# Patient Record
Sex: MALE | Race: BLACK OR AFRICAN AMERICAN | NOT HISPANIC OR LATINO | URBAN - METROPOLITAN AREA
[De-identification: names, ages, dates, MRNs, and addresses within clinical notes are randomized per-mention and may not be internally consistent; named-entity substitution may affect disease eponyms.]

---

## 2024-01-26 ENCOUNTER — OFFICE VISIT (OUTPATIENT)
Dept: URBAN - METROPOLITAN AREA CLINIC 25 | Facility: CLINIC | Age: 59
End: 2024-01-26

## 2024-02-02 ENCOUNTER — OV CON (OUTPATIENT)
Dept: URBAN - METROPOLITAN AREA CLINIC 25 | Facility: CLINIC | Age: 59
End: 2024-02-02
Payer: COMMERCIAL

## 2024-02-02 VITALS
DIASTOLIC BLOOD PRESSURE: 105 MMHG | HEIGHT: 72 IN | SYSTOLIC BLOOD PRESSURE: 172 MMHG | BODY MASS INDEX: 27.17 KG/M2 | WEIGHT: 200.6 LBS | HEART RATE: 90 BPM | TEMPERATURE: 97.5 F

## 2024-02-02 DIAGNOSIS — R14.0 ABDOMINAL BLOATING: ICD-10-CM

## 2024-02-02 DIAGNOSIS — K40.20 BILATERAL INGUINAL HERNIA WITHOUT OBSTRUCTION OR GANGRENE, RECURRENCE NOT SPECIFIED: ICD-10-CM

## 2024-02-02 DIAGNOSIS — K42.9 PERIUMBILICAL HERNIA: ICD-10-CM

## 2024-02-02 PROCEDURE — 99204 OFFICE O/P NEW MOD 45 MIN: CPT

## 2024-02-02 NOTE — HPI-TODAY'S VISIT:
02/24 OV  Mr. Gale is  58y M, he presents today w/ complaints of bloating, patient had a hernia repair for 3 ventral hernias in 2022', patient cannot recall the previous surgeon's name,  he notes as of late he has noticed that his henria has been bulging out in his abdomen and is noticeable when he has excessive bloating, bloating occurs intermittently, occurring over the last 2-3 months, no associated abdominal pain. Deneis has constipation or hard stools, 2 daily BM's, no change in bowels. Patient denies BRBPR, melena, unintentional weigth loss, SOB/CP, hx of acid indigestion takes nexium prn  - Last colonoscopy: N/A, last cologuard last year negative

## 2024-03-08 ENCOUNTER — OV EP (OUTPATIENT)
Dept: URBAN - METROPOLITAN AREA CLINIC 25 | Facility: CLINIC | Age: 59
End: 2024-03-08

## 2024-05-17 ENCOUNTER — DASHBOARD ENCOUNTERS (OUTPATIENT)
Age: 59
End: 2024-05-17

## 2024-05-17 ENCOUNTER — OFFICE VISIT (OUTPATIENT)
Dept: URBAN - METROPOLITAN AREA CLINIC 25 | Facility: CLINIC | Age: 59
End: 2024-05-17
Payer: COMMERCIAL

## 2024-05-17 VITALS
TEMPERATURE: 98.8 F | WEIGHT: 189 LBS | DIASTOLIC BLOOD PRESSURE: 92 MMHG | HEART RATE: 76 BPM | BODY MASS INDEX: 25.6 KG/M2 | SYSTOLIC BLOOD PRESSURE: 144 MMHG | HEIGHT: 72 IN

## 2024-05-17 DIAGNOSIS — Z87.19 HISTORY OF SMALL BOWEL OBSTRUCTION: ICD-10-CM

## 2024-05-17 DIAGNOSIS — K42.9 PERIUMBILICAL HERNIA: ICD-10-CM

## 2024-05-17 DIAGNOSIS — Z12.11 COLON CANCER SCREENING: ICD-10-CM

## 2024-05-17 DIAGNOSIS — K40.20 BILATERAL INGUINAL HERNIA WITHOUT OBSTRUCTION OR GANGRENE, RECURRENCE NOT SPECIFIED: ICD-10-CM

## 2024-05-17 DIAGNOSIS — R14.0 ABDOMINAL BLOATING: ICD-10-CM

## 2024-05-17 DIAGNOSIS — K30 ACID INDIGESTION: ICD-10-CM

## 2024-05-17 DIAGNOSIS — R93.3 ABNORMAL COMPUTED TOMOGRAPHY OF ESOPHAGUS: ICD-10-CM

## 2024-05-17 PROBLEM — 162031009: Status: ACTIVE | Noted: 2024-05-17

## 2024-05-17 PROCEDURE — 99214 OFFICE O/P EST MOD 30 MIN: CPT

## 2024-05-17 RX ORDER — OMEPRAZOLE 40 MG/1
1 CAPSULE 30 MINUTES BEFORE MORNING MEAL CAPSULE, DELAYED RELEASE ORAL ONCE A DAY
Qty: 30 | Refills: 3 | OUTPATIENT
Start: 2024-05-17

## 2024-05-17 RX ORDER — POLYETHYLENE GLYCOL 3350, SODIUM SULFATE ANHYDROUS, SODIUM BICARBONATE, SODIUM CHLORIDE, POTASSIUM CHLORIDE 236; 22.74; 6.74; 5.86; 2.97 G/4L; G/4L; G/4L; G/4L; G/4L
AS DIRECTED POWDER, FOR SOLUTION ORAL
Qty: 1 | Refills: 0 | OUTPATIENT
Start: 2024-05-17 | End: 2024-05-18

## 2024-05-20 ENCOUNTER — TELEPHONE ENCOUNTER (OUTPATIENT)
Dept: URBAN - METROPOLITAN AREA CLINIC 25 | Facility: CLINIC | Age: 59
End: 2024-05-20

## 2024-05-22 ENCOUNTER — TELEPHONE ENCOUNTER (OUTPATIENT)
Dept: URBAN - METROPOLITAN AREA CLINIC 25 | Facility: CLINIC | Age: 59
End: 2024-05-22

## 2024-05-29 ENCOUNTER — TELEPHONE ENCOUNTER (OUTPATIENT)
Dept: URBAN - METROPOLITAN AREA CLINIC 25 | Facility: CLINIC | Age: 59
End: 2024-05-29

## 2024-05-30 ENCOUNTER — OFFICE VISIT (OUTPATIENT)
Dept: URBAN - METROPOLITAN AREA SURGERY CENTER 20 | Facility: SURGERY CENTER | Age: 59
End: 2024-05-30

## 2024-05-30 RX ORDER — OMEPRAZOLE 40 MG/1
1 CAPSULE 30 MINUTES BEFORE MORNING MEAL CAPSULE, DELAYED RELEASE ORAL ONCE A DAY
Qty: 30 | Refills: 3 | Status: ACTIVE | COMMUNITY
Start: 2024-05-17

## 2024-06-07 ENCOUNTER — TELEPHONE ENCOUNTER (OUTPATIENT)
Dept: URBAN - METROPOLITAN AREA CLINIC 25 | Facility: CLINIC | Age: 59
End: 2024-06-07

## 2024-06-20 ENCOUNTER — OUT OF OFFICE VISIT (OUTPATIENT)
Dept: URBAN - METROPOLITAN AREA SURGERY CENTER 20 | Facility: SURGERY CENTER | Age: 59
End: 2024-06-20
Payer: COMMERCIAL

## 2024-06-20 ENCOUNTER — OFFICE VISIT (OUTPATIENT)
Dept: URBAN - METROPOLITAN AREA SURGERY CENTER 20 | Facility: SURGERY CENTER | Age: 59
End: 2024-06-20

## 2024-06-20 ENCOUNTER — OFFICE VISIT (OUTPATIENT)
Dept: URBAN - METROPOLITAN AREA CLINIC 25 | Facility: CLINIC | Age: 59
End: 2024-06-20

## 2024-06-20 ENCOUNTER — CLAIMS CREATED FROM THE CLAIM WINDOW (OUTPATIENT)
Dept: URBAN - METROPOLITAN AREA CLINIC 4 | Facility: CLINIC | Age: 59
End: 2024-06-20
Payer: COMMERCIAL

## 2024-06-20 DIAGNOSIS — E11.9 ABNORMAL METABOLIC STATE DUE TO DIABETES MELLITUS: ICD-10-CM

## 2024-06-20 DIAGNOSIS — K63.5 BENIGN COLON POLYP: ICD-10-CM

## 2024-06-20 DIAGNOSIS — Z12.11 COLON CANCER SCREENING: ICD-10-CM

## 2024-06-20 DIAGNOSIS — K64.0 BLEEDING GRADE I HEMORRHOIDS: ICD-10-CM

## 2024-06-20 DIAGNOSIS — K62.1 ANAL AND RECTAL POLYP: ICD-10-CM

## 2024-06-20 DIAGNOSIS — D17.9 BENIGN LIPOMATOUS NEOPLASM, UNSPECIFIED: ICD-10-CM

## 2024-06-20 DIAGNOSIS — K63.5 POLYP OF COLON: ICD-10-CM

## 2024-06-20 PROCEDURE — 88305 TISSUE EXAM BY PATHOLOGIST: CPT | Performed by: PATHOLOGY

## 2024-06-20 PROCEDURE — 00812 ANES LWR INTST SCR COLSC: CPT | Performed by: NURSE ANESTHETIST, CERTIFIED REGISTERED

## 2024-06-20 RX ORDER — OMEPRAZOLE 40 MG/1
1 CAPSULE 30 MINUTES BEFORE MORNING MEAL CAPSULE, DELAYED RELEASE ORAL ONCE A DAY
Qty: 30 | Refills: 3 | Status: ACTIVE | COMMUNITY
Start: 2024-05-17

## 2024-08-02 ENCOUNTER — OFFICE VISIT (OUTPATIENT)
Dept: URBAN - METROPOLITAN AREA CLINIC 25 | Facility: CLINIC | Age: 59
End: 2024-08-02
Payer: COMMERCIAL

## 2024-08-02 VITALS
TEMPERATURE: 97.9 F | SYSTOLIC BLOOD PRESSURE: 125 MMHG | DIASTOLIC BLOOD PRESSURE: 89 MMHG | BODY MASS INDEX: 25.57 KG/M2 | WEIGHT: 188.8 LBS | HEART RATE: 86 BPM | HEIGHT: 72 IN

## 2024-08-02 DIAGNOSIS — K40.20 BILATERAL INGUINAL HERNIA WITHOUT OBSTRUCTION OR GANGRENE, RECURRENCE NOT SPECIFIED: ICD-10-CM

## 2024-08-02 DIAGNOSIS — K21.9 GERD WITHOUT ESOPHAGITIS: ICD-10-CM

## 2024-08-02 DIAGNOSIS — R10.33 PERIUMBILICAL ABDOMINAL PAIN: ICD-10-CM

## 2024-08-02 DIAGNOSIS — K42.9 PERIUMBILICAL HERNIA: ICD-10-CM

## 2024-08-02 PROBLEM — 266435005: Status: ACTIVE | Noted: 2024-08-02

## 2024-08-02 PROCEDURE — 99214 OFFICE O/P EST MOD 30 MIN: CPT

## 2024-08-02 RX ORDER — OMEPRAZOLE 40 MG/1
1 CAPSULE 30 MINUTES BEFORE MORNING MEAL CAPSULE, DELAYED RELEASE ORAL ONCE A DAY
Qty: 90 | Refills: 3 | OUTPATIENT
Start: 2024-08-02

## 2024-08-02 RX ORDER — OMEPRAZOLE 40 MG/1
1 CAPSULE 30 MINUTES BEFORE MORNING MEAL CAPSULE, DELAYED RELEASE ORAL ONCE A DAY
Qty: 30 | Refills: 3 | Status: ACTIVE | COMMUNITY
Start: 2024-05-17

## 2024-08-02 NOTE — HPI-OTHER HISTORIES
05/24 OV  S/p US which revealed anterior abdominal wall hernia w/ surrounding fluid, patient has not contacted his previous surgeon Dr. Uribe, patient was admitted to the ER on 05/07/24 w/ SBO, Dr. Lorenzo was consulted and NGT was progressed and patient was able to pass gas and have a BM, no surgical procedures performed, suspected SBO 2' to adhesions from previous abdominal surgery, elevated bilirubin of 1.3, mild hypokalemia and hypocalcemia corrected w/ IVF, otherwise CBC/CMP unremarkable. CTA mentioned esophageal wall thickening, possible small bowel ileus vs mechanical SBO  (+) SBO  - The patient initially went to the ER for abdominal pain, the pain has partially resolved, notes mild tenderness around his naval around the area of his hernia repair  - Patient last BM yesterday and admits to passing gas, no N/V  - No diarrhea, bowels are soft, no palpitations  - Patient f/u w/ PCP, no f/u w/ Dr. Lorenzo  (+) Abnormal CT esophagitis  - No dysphagia, odynophagia, however, admits to acid reflux, currently taking nexium 20mg QD  - Denies BRBPR, melena, unintentional weight loss, SOB/CP   US 2024  Suspect anterior abdominal wall hernia which surrounding fluid. The total extent of the hernia may be better defined with CT  CTA Bowel ischemia protocol 2024  1. Small bowel ileus versus evolving mechanical small bowel obstruction with transition in the right hemiabdomen. Recommend surgical consultation attention on follow-up abdominal radiograph. 2. No evidence of active GI bleed or acute bowel ischemia. 3. Distal esophageal wall thickening as may be seen in esophagitis. 4. Anterior abdominal wall mesh material.  X ray Abd 2 view   Dilated gas-filled loops of small bowel within the central abdomen measuring up to 4.8 cm., Minimally increased when compared to the prior CT  02/24 OV  Mr. Gale is  58y M, he presents today w/ complaints of bloating, patient had a hernia repair for 3 ventral hernias in 2022', patient cannot recall the previous surgeon's name,  he notes as of late he has noticed that his henria has been bulging out in his abdomen and is noticeable when he has excessive bloating, bloating occurs intermittently, occurring over the last 2-3 months, no associated abdominal pain. Saravanan has constipation or hard stools, 2 daily BM's, no change in bowels. Patient denies BRBPR, melena, unintentional weigth loss, SOB/CP, hx of acid indigestion takes nexium prn  - Last colonoscopy: N/A, last cologuard was performed last year and was negative

## 2024-08-02 NOTE — HPI-TODAY'S VISIT:
08/24 OV S/p EGD/Colon following recent SBO and abnormal CT of distal esophagus, grade B esophagitis noted on EGD, advised to start PPI, 4cm HH also noted, otherxie normal EGD, colon showed inadequate prep and a 5mm HP polyp and non bleeding internal hemorrhoids, advised 1 year recall d/t prep. Patient unable to  his PPI d/t cost prohibition, pharmacy charging $100, PCP prescribed pantoprazole which was more affordable as an alternative, currently taking pantoprazole QD, no heartburn, well controlled, but admits to eructation still. The patient has not had a hernia repair, he has not seen Dr. Lorenzo yet.    New onset abdominal discomfort in the periumbilical area over last 2 weeks which has slowly been subsiding, The patient reports he can pass gas and stool. Sitting makes pain worse, better w/ movements. Straining or pressure from a seat belt or pressing on the area makes pain worse. Discomfort is localized around his hernia. Daily BM, no hard stools/straining, No unintentional weigth loss, SOB/CP, BRBPR, melena.    Colon 2024  - Preparation of the colon was inadequate. - Preparation of the colon was inadequate. - One 5 mm polyp in the ascending colon, removed with a cold snare. Complete resection. Polyp tissue not retrieved. - HP  - One 3 mm polyp in the transverse colon, removed with a cold biopsy forceps. Resected and retrieved. - Submucosal lipoma  - Two 1 to 2 mm polyps in the rectum, removed with a cold biopsy forceps. Resected and retrieved. - Non-bleeding internal hemorrhoids.  EGD 2024  - LA Grade B reflux esophagitis with no bleeding. Biopsied. - [place] mucosa w/ reflux type changes  - Esophagogastric landmarks identified. - 4 cm hiatal hernia. - Normal stomach. - Mild chemical gastropahty  - Normal examined duodenum. - Gastroesophageal flap valve classified as Hill Grade IV (no fold, wide open lumen, hiatal hernia present).

## 2024-08-27 ENCOUNTER — TELEPHONE ENCOUNTER (OUTPATIENT)
Dept: URBAN - METROPOLITAN AREA CLINIC 25 | Facility: CLINIC | Age: 59
End: 2024-08-27

## 2024-10-10 ENCOUNTER — OFFICE VISIT (OUTPATIENT)
Dept: URBAN - METROPOLITAN AREA CLINIC 25 | Facility: CLINIC | Age: 59
End: 2024-10-10

## 2024-10-18 ENCOUNTER — OFFICE VISIT (OUTPATIENT)
Dept: URBAN - METROPOLITAN AREA CLINIC 25 | Facility: CLINIC | Age: 59
End: 2024-10-18

## 2024-10-25 ENCOUNTER — OFFICE VISIT (OUTPATIENT)
Dept: URBAN - METROPOLITAN AREA CLINIC 25 | Facility: CLINIC | Age: 59
End: 2024-10-25

## 2024-10-30 ENCOUNTER — OFFICE VISIT (OUTPATIENT)
Dept: URBAN - METROPOLITAN AREA CLINIC 25 | Facility: CLINIC | Age: 59
End: 2024-10-30

## 2025-07-11 ENCOUNTER — TELEPHONE ENCOUNTER (OUTPATIENT)
Dept: URBAN - METROPOLITAN AREA CLINIC 84 | Facility: CLINIC | Age: 60
End: 2025-07-11

## 2025-07-11 ENCOUNTER — OFFICE VISIT (OUTPATIENT)
Dept: URBAN - METROPOLITAN AREA CLINIC 25 | Facility: CLINIC | Age: 60
End: 2025-07-11
Payer: COMMERCIAL

## 2025-07-11 DIAGNOSIS — Z87.19 HISTORY OF SMALL BOWEL OBSTRUCTION: ICD-10-CM

## 2025-07-11 DIAGNOSIS — Z12.11 COLON CANCER SCREENING: ICD-10-CM

## 2025-07-11 DIAGNOSIS — K42.9 PERIUMBILICAL HERNIA: ICD-10-CM

## 2025-07-11 PROCEDURE — 99213 OFFICE O/P EST LOW 20 MIN: CPT

## 2025-07-11 RX ORDER — OMEPRAZOLE 40 MG/1
1 CAPSULE 30 MINUTES BEFORE MORNING MEAL CAPSULE, DELAYED RELEASE ORAL ONCE A DAY
Qty: 30 | Refills: 3 | Status: DISCONTINUED | COMMUNITY
Start: 2024-05-17

## 2025-07-11 RX ORDER — OMEPRAZOLE 40 MG/1
1 CAPSULE 30 MINUTES BEFORE MORNING MEAL CAPSULE, DELAYED RELEASE ORAL ONCE A DAY
Qty: 90 | Refills: 3 | Status: DISCONTINUED | COMMUNITY
Start: 2024-08-02

## 2025-07-11 NOTE — HPI-TODAY'S VISIT:
07/25 OV  Patient presents for a repeat colonscopy, last colon in 06/25, which revealed multiple polyps, but inadequate prep. Daily BM, no hard stools/straining, no N/V, no appetite changes, he denies unintentional wt loss, no BRBPR, melena, abdominal pain, SOB/CP, No known family h/o colon cancer/polyps. Denies cardiac hardware, dialysis, blood thinner use, and or issues with anesthesia

## 2025-07-11 NOTE — HPI-OTHER HISTORIES
08/24 OV S/p EGD/Colon following recent SBO and abnormal CT of distal esophagus, grade B esophagitis noted on EGD, advised to start PPI, 4cm HH also noted, otherxie normal EGD, colon showed inadequate prep and a 5mm HP polyp and non bleeding internal hemorrhoids, advised 1 year recall d/t prep. Patient unable to  his PPI d/t cost prohibition, pharmacy charging $100, PCP prescribed pantoprazole which was more affordable as an alternative, currently taking pantoprazole QD, no heartburn, well controlled, but admits to eructation still. The patient has not had a hernia repair, he has not seen Dr. Lorenzo yet.    New onset abdominal discomfort in the periumbilical area over last 2 weeks which has slowly been subsiding, The patient reports he can pass gas and stool. Sitting makes pain worse, better w/ movements. Straining or pressure from a seat belt or pressing on the area makes pain worse. Discomfort is localized around his hernia. Daily BM, no hard stools/straining, No unintentional weigth loss, SOB/CP, BRBPR, melena.    Colon 2024  - Preparation of the colon was inadequate. - Preparation of the colon was inadequate. - One 5 mm polyp in the ascending colon, removed with a cold snare. Complete resection. Polyp tissue not retrieved. - HP  - One 3 mm polyp in the transverse colon, removed with a cold biopsy forceps. Resected and retrieved. - Submucosal lipoma  - Two 1 to 2 mm polyps in the rectum, removed with a cold biopsy forceps. Resected and retrieved. - Non-bleeding internal hemorrhoids.  EGD 2024  - LA Grade B reflux esophagitis with no bleeding. Biopsied. - [place] mucosa w/ reflux type changes  - Esophagogastric landmarks identified. - 4 cm hiatal hernia. - Normal stomach. - Mild chemical gastropahty  - Normal examined duodenum. - Gastroesophageal flap valve classified as Hill Grade IV (no fold, wide open lumen, hiatal hernia present).   05/24 OV  S/p US which revealed anterior abdominal wall hernia w/ surrounding fluid, patient has not contacted his previous surgeon Dr. Uribe, patient was admitted to the ER on 05/07/24 w/ SBO, Dr. Lorenzo was consulted and NGT was progressed and patient was able to pass gas and have a BM, no surgical procedures performed, suspected SBO 2' to adhesions from previous abdominal surgery, elevated bilirubin of 1.3, mild hypokalemia and hypocalcemia corrected w/ IVF, otherwise CBC/CMP unremarkable. CTA mentioned esophageal wall thickening, possible small bowel ileus vs mechanical SBO  (+) SBO  - The patient initially went to the ER for abdominal pain, the pain has partially resolved, notes mild tenderness around his naval around the area of his hernia repair  - Patient last BM yesterday and admits to passing gas, no N/V  - No diarrhea, bowels are soft, no palpitations  - Patient f/u w/ PCP, no f/u w/ Dr. Lorenzo  (+) Abnormal CT esophagitis  - No dysphagia, odynophagia, however, admits to acid reflux, currently taking nexium 20mg QD  - Denies BRBPR, melena, unintentional weight loss, SOB/CP   US 2024  Suspect anterior abdominal wall hernia which surrounding fluid. The total extent of the hernia may be better defined with CT  CTA Bowel ischemia protocol 2024  1. Small bowel ileus versus evolving mechanical small bowel obstruction with transition in the right hemiabdomen. Recommend surgical consultation attention on follow-up abdominal radiograph. 2. No evidence of active GI bleed or acute bowel ischemia. 3. Distal esophageal wall thickening as may be seen in esophagitis. 4. Anterior abdominal wall mesh material.  X ray Abd 2 view   Dilated gas-filled loops of small bowel within the central abdomen measuring up to 4.8 cm., Minimally increased when compared to the prior CT  02/24 OV  Mr. Gale is  58y M, he presents today w/ complaints of bloating, patient had a hernia repair for 3 ventral hernias in 2022', patient cannot recall the previous surgeon's name,  he notes as of late he has noticed that his henria has been bulging out in his abdomen and is noticeable when he has excessive bloating, bloating occurs intermittently, occurring over the last 2-3 months, no associated abdominal pain. Saravanan has constipation or hard stools, 2 daily BM's, no change in bowels. Patient denies BRBPR, melena, unintentional weigth loss, SOB/CP, hx of acid indigestion takes nexium prn  - Last colonoscopy: N/A, last cologuard was performed last year and was negative

## 2025-07-21 ENCOUNTER — TELEPHONE ENCOUNTER (OUTPATIENT)
Dept: URBAN - METROPOLITAN AREA CLINIC 25 | Facility: CLINIC | Age: 60
End: 2025-07-21

## 2025-07-21 RX ORDER — POLYETHYLENE GLYCOL 3350, SODIUM SULFATE ANHYDROUS, SODIUM BICARBONATE, SODIUM CHLORIDE, POTASSIUM CHLORIDE 236; 22.74; 6.74; 5.86; 2.97 G/4L; G/4L; G/4L; G/4L; G/4L
AS DIRECTED POWDER, FOR SOLUTION ORAL
Qty: 1 | Refills: 0 | OUTPATIENT
Start: 2025-07-22 | End: 2025-07-23

## 2025-07-31 ENCOUNTER — OFFICE VISIT (OUTPATIENT)
Dept: URBAN - METROPOLITAN AREA SURGERY CENTER 20 | Facility: SURGERY CENTER | Age: 60
End: 2025-07-31

## 2025-08-05 ENCOUNTER — CLAIMS CREATED FROM THE CLAIM WINDOW (OUTPATIENT)
Dept: URBAN - METROPOLITAN AREA SURGERY CENTER 20 | Facility: SURGERY CENTER | Age: 60
End: 2025-08-05
Payer: COMMERCIAL

## 2025-08-05 ENCOUNTER — CLAIMS CREATED FROM THE CLAIM WINDOW (OUTPATIENT)
Dept: URBAN - METROPOLITAN AREA CLINIC 4 | Facility: CLINIC | Age: 60
End: 2025-08-05
Payer: COMMERCIAL

## 2025-08-05 DIAGNOSIS — D12.2 ADENOMA OF ASCENDING COLON: ICD-10-CM

## 2025-08-05 DIAGNOSIS — Z12.11 COLON CANCER SCREENING: ICD-10-CM

## 2025-08-05 DIAGNOSIS — K63.5 POLYP OF COLON: ICD-10-CM

## 2025-08-05 DIAGNOSIS — Z12.11 ENCOUNTER FOR SCREENING FOR MALIGNANT NEOPLASM OF COLON: ICD-10-CM

## 2025-08-05 DIAGNOSIS — K63.5 BENIGN COLON POLYP: ICD-10-CM

## 2025-08-05 DIAGNOSIS — K63.5 COLONIC POLYP: ICD-10-CM

## 2025-08-05 PROCEDURE — 45380 COLONOSCOPY AND BIOPSY: CPT | Performed by: INTERNAL MEDICINE

## 2025-08-05 PROCEDURE — 00812 ANES LWR INTST SCR COLSC: CPT | Performed by: NURSE ANESTHETIST, CERTIFIED REGISTERED

## 2025-08-05 PROCEDURE — 88305 TISSUE EXAM BY PATHOLOGIST: CPT | Performed by: PATHOLOGY
